# Patient Record
Sex: FEMALE | Race: WHITE | NOT HISPANIC OR LATINO
[De-identification: names, ages, dates, MRNs, and addresses within clinical notes are randomized per-mention and may not be internally consistent; named-entity substitution may affect disease eponyms.]

---

## 2018-03-28 ENCOUNTER — RESULT REVIEW (OUTPATIENT)
Age: 38
End: 2018-03-28

## 2018-06-22 ENCOUNTER — ASOB RESULT (OUTPATIENT)
Age: 38
End: 2018-06-22

## 2018-06-22 ENCOUNTER — APPOINTMENT (OUTPATIENT)
Dept: ANTEPARTUM | Facility: CLINIC | Age: 38
End: 2018-06-22
Payer: COMMERCIAL

## 2018-06-22 PROCEDURE — 76811 OB US DETAILED SNGL FETUS: CPT

## 2018-06-22 PROCEDURE — 76817 TRANSVAGINAL US OBSTETRIC: CPT | Mod: 59

## 2018-10-28 ENCOUNTER — INPATIENT (INPATIENT)
Facility: HOSPITAL | Age: 38
LOS: 1 days | Discharge: HOME | End: 2018-10-30
Attending: OBSTETRICS & GYNECOLOGY | Admitting: OBSTETRICS & GYNECOLOGY
Payer: COMMERCIAL

## 2018-10-28 VITALS
OXYGEN SATURATION: 98 % | HEART RATE: 69 BPM | SYSTOLIC BLOOD PRESSURE: 127 MMHG | TEMPERATURE: 98 F | DIASTOLIC BLOOD PRESSURE: 72 MMHG | RESPIRATION RATE: 16 BRPM

## 2018-10-28 DIAGNOSIS — Z34.80 ENCOUNTER FOR SUPERVISION OF OTHER NORMAL PREGNANCY, UNSPECIFIED TRIMESTER: ICD-10-CM

## 2018-10-28 DIAGNOSIS — Z3A.00 WEEKS OF GESTATION OF PREGNANCY NOT SPECIFIED: ICD-10-CM

## 2018-10-28 DIAGNOSIS — O26.899 OTHER SPECIFIED PREGNANCY RELATED CONDITIONS, UNSPECIFIED TRIMESTER: ICD-10-CM

## 2018-10-28 LAB
BASOPHILS # BLD AUTO: 0 K/UL — SIGNIFICANT CHANGE UP (ref 0–0.2)
BASOPHILS NFR BLD AUTO: 0 % — SIGNIFICANT CHANGE UP (ref 0–2)
EOSINOPHIL # BLD AUTO: 0 K/UL — SIGNIFICANT CHANGE UP (ref 0–0.5)
EOSINOPHIL NFR BLD AUTO: 0.2 % — SIGNIFICANT CHANGE UP (ref 0–6)
HCT VFR BLD CALC: 38.7 % — SIGNIFICANT CHANGE UP (ref 34.5–45)
HGB BLD-MCNC: 13.4 G/DL — SIGNIFICANT CHANGE UP (ref 11.5–15.5)
LYMPHOCYTES # BLD AUTO: 1.1 K/UL — SIGNIFICANT CHANGE UP (ref 1–3.3)
LYMPHOCYTES # BLD AUTO: 8.7 % — LOW (ref 13–44)
MCHC RBC-ENTMCNC: 28.8 PG — SIGNIFICANT CHANGE UP (ref 27–34)
MCHC RBC-ENTMCNC: 34.6 GM/DL — SIGNIFICANT CHANGE UP (ref 32–36)
MCV RBC AUTO: 83.3 FL — SIGNIFICANT CHANGE UP (ref 80–100)
MONOCYTES # BLD AUTO: 0.5 K/UL — SIGNIFICANT CHANGE UP (ref 0–0.9)
MONOCYTES NFR BLD AUTO: 3.9 % — SIGNIFICANT CHANGE UP (ref 2–14)
NEUTROPHILS # BLD AUTO: 10.7 K/UL — HIGH (ref 1.8–7.4)
NEUTROPHILS NFR BLD AUTO: 87.2 % — HIGH (ref 43–77)
PLATELET # BLD AUTO: 219 K/UL — SIGNIFICANT CHANGE UP (ref 150–400)
RBC # BLD: 4.65 M/UL — SIGNIFICANT CHANGE UP (ref 3.8–5.2)
RBC # FLD: 13.2 % — SIGNIFICANT CHANGE UP (ref 10.3–14.5)
WBC # BLD: 12.3 K/UL — HIGH (ref 3.8–10.5)
WBC # FLD AUTO: 12.3 K/UL — HIGH (ref 3.8–10.5)

## 2018-10-28 PROCEDURE — 86077 PHYS BLOOD BANK SERV XMATCH: CPT

## 2018-10-28 RX ORDER — OXYTOCIN 10 UNIT/ML
41.67 VIAL (ML) INJECTION
Qty: 20 | Refills: 0 | Status: DISCONTINUED | OUTPATIENT
Start: 2018-10-28 | End: 2018-10-28

## 2018-10-28 RX ORDER — SODIUM CHLORIDE 9 MG/ML
1000 INJECTION, SOLUTION INTRAVENOUS
Qty: 0 | Refills: 0 | Status: DISCONTINUED | OUTPATIENT
Start: 2018-10-28 | End: 2018-10-28

## 2018-10-28 RX ORDER — IBUPROFEN 200 MG
600 TABLET ORAL EVERY 6 HOURS
Qty: 0 | Refills: 0 | Status: COMPLETED | OUTPATIENT
Start: 2018-10-28 | End: 2019-09-26

## 2018-10-28 RX ORDER — CITRIC ACID/SODIUM CITRATE 300-500 MG
15 SOLUTION, ORAL ORAL EVERY 4 HOURS
Qty: 0 | Refills: 0 | Status: DISCONTINUED | OUTPATIENT
Start: 2018-10-28 | End: 2018-10-28

## 2018-10-28 RX ORDER — SODIUM CHLORIDE 9 MG/ML
3 INJECTION INTRAMUSCULAR; INTRAVENOUS; SUBCUTANEOUS EVERY 8 HOURS
Qty: 0 | Refills: 0 | Status: DISCONTINUED | OUTPATIENT
Start: 2018-10-28 | End: 2018-10-28

## 2018-10-28 RX ORDER — OXYCODONE HYDROCHLORIDE 5 MG/1
5 TABLET ORAL
Qty: 0 | Refills: 0 | Status: DISCONTINUED | OUTPATIENT
Start: 2018-10-28 | End: 2018-10-30

## 2018-10-28 RX ORDER — IBUPROFEN 200 MG
600 TABLET ORAL EVERY 6 HOURS
Qty: 0 | Refills: 0 | Status: DISCONTINUED | OUTPATIENT
Start: 2018-10-28 | End: 2018-10-30

## 2018-10-28 RX ORDER — PRAMOXINE HYDROCHLORIDE 150 MG/15G
1 AEROSOL, FOAM RECTAL EVERY 4 HOURS
Qty: 0 | Refills: 0 | Status: DISCONTINUED | OUTPATIENT
Start: 2018-10-28 | End: 2018-10-30

## 2018-10-28 RX ORDER — DOCUSATE SODIUM 100 MG
100 CAPSULE ORAL
Qty: 0 | Refills: 0 | Status: DISCONTINUED | OUTPATIENT
Start: 2018-10-28 | End: 2018-10-30

## 2018-10-28 RX ORDER — SIMETHICONE 80 MG/1
80 TABLET, CHEWABLE ORAL EVERY 6 HOURS
Qty: 0 | Refills: 0 | Status: DISCONTINUED | OUTPATIENT
Start: 2018-10-28 | End: 2018-10-30

## 2018-10-28 RX ORDER — MAGNESIUM HYDROXIDE 400 MG/1
30 TABLET, CHEWABLE ORAL
Qty: 0 | Refills: 0 | Status: DISCONTINUED | OUTPATIENT
Start: 2018-10-28 | End: 2018-10-30

## 2018-10-28 RX ORDER — OXYTOCIN 10 UNIT/ML
333.33 VIAL (ML) INJECTION
Qty: 20 | Refills: 0 | Status: DISCONTINUED | OUTPATIENT
Start: 2018-10-28 | End: 2018-10-28

## 2018-10-28 RX ORDER — DIBUCAINE 1 %
1 OINTMENT (GRAM) RECTAL EVERY 4 HOURS
Qty: 0 | Refills: 0 | Status: DISCONTINUED | OUTPATIENT
Start: 2018-10-28 | End: 2018-10-28

## 2018-10-28 RX ORDER — DIPHENHYDRAMINE HCL 50 MG
25 CAPSULE ORAL EVERY 6 HOURS
Qty: 0 | Refills: 0 | Status: DISCONTINUED | OUTPATIENT
Start: 2018-10-28 | End: 2018-10-30

## 2018-10-28 RX ORDER — OXYTOCIN 10 UNIT/ML
41.67 VIAL (ML) INJECTION
Qty: 20 | Refills: 0 | Status: DISCONTINUED | OUTPATIENT
Start: 2018-10-28 | End: 2018-10-30

## 2018-10-28 RX ORDER — TETANUS TOXOID, REDUCED DIPHTHERIA TOXOID AND ACELLULAR PERTUSSIS VACCINE, ADSORBED 5; 2.5; 8; 8; 2.5 [IU]/.5ML; [IU]/.5ML; UG/.5ML; UG/.5ML; UG/.5ML
0.5 SUSPENSION INTRAMUSCULAR ONCE
Qty: 0 | Refills: 0 | Status: DISCONTINUED | OUTPATIENT
Start: 2018-10-28 | End: 2018-10-30

## 2018-10-28 RX ORDER — ACETAMINOPHEN 500 MG
975 TABLET ORAL EVERY 6 HOURS
Qty: 0 | Refills: 0 | Status: DISCONTINUED | OUTPATIENT
Start: 2018-10-28 | End: 2018-10-30

## 2018-10-28 RX ORDER — DIBUCAINE 1 %
1 OINTMENT (GRAM) RECTAL EVERY 4 HOURS
Qty: 0 | Refills: 0 | Status: DISCONTINUED | OUTPATIENT
Start: 2018-10-28 | End: 2018-10-30

## 2018-10-28 RX ORDER — AER TRAVELER 0.5 G/1
1 SOLUTION RECTAL; TOPICAL EVERY 4 HOURS
Qty: 0 | Refills: 0 | Status: DISCONTINUED | OUTPATIENT
Start: 2018-10-28 | End: 2018-10-28

## 2018-10-28 RX ORDER — HYDROCORTISONE 1 %
1 OINTMENT (GRAM) TOPICAL EVERY 4 HOURS
Qty: 0 | Refills: 0 | Status: DISCONTINUED | OUTPATIENT
Start: 2018-10-28 | End: 2018-10-28

## 2018-10-28 RX ORDER — ACETAMINOPHEN 500 MG
975 TABLET ORAL EVERY 6 HOURS
Qty: 0 | Refills: 0 | Status: COMPLETED | OUTPATIENT
Start: 2018-10-28 | End: 2019-09-26

## 2018-10-28 RX ORDER — AER TRAVELER 0.5 G/1
1 SOLUTION RECTAL; TOPICAL EVERY 4 HOURS
Qty: 0 | Refills: 0 | Status: DISCONTINUED | OUTPATIENT
Start: 2018-10-28 | End: 2018-10-30

## 2018-10-28 RX ORDER — OXYCODONE HYDROCHLORIDE 5 MG/1
5 TABLET ORAL EVERY 4 HOURS
Qty: 0 | Refills: 0 | Status: DISCONTINUED | OUTPATIENT
Start: 2018-10-28 | End: 2018-10-30

## 2018-10-28 RX ORDER — HYDROCORTISONE 1 %
1 OINTMENT (GRAM) TOPICAL EVERY 4 HOURS
Qty: 0 | Refills: 0 | Status: DISCONTINUED | OUTPATIENT
Start: 2018-10-28 | End: 2018-10-30

## 2018-10-28 RX ORDER — SODIUM CHLORIDE 9 MG/ML
500 INJECTION, SOLUTION INTRAVENOUS ONCE
Qty: 0 | Refills: 0 | Status: DISCONTINUED | OUTPATIENT
Start: 2018-10-28 | End: 2018-10-28

## 2018-10-28 RX ORDER — PRAMOXINE HYDROCHLORIDE 150 MG/15G
1 AEROSOL, FOAM RECTAL EVERY 4 HOURS
Qty: 0 | Refills: 0 | Status: DISCONTINUED | OUTPATIENT
Start: 2018-10-28 | End: 2018-10-28

## 2018-10-28 RX ORDER — GLYCERIN ADULT
1 SUPPOSITORY, RECTAL RECTAL AT BEDTIME
Qty: 0 | Refills: 0 | Status: DISCONTINUED | OUTPATIENT
Start: 2018-10-28 | End: 2018-10-30

## 2018-10-28 RX ORDER — KETOROLAC TROMETHAMINE 30 MG/ML
30 SYRINGE (ML) INJECTION ONCE
Qty: 0 | Refills: 0 | Status: DISCONTINUED | OUTPATIENT
Start: 2018-10-28 | End: 2018-10-28

## 2018-10-28 RX ORDER — LANOLIN
1 OINTMENT (GRAM) TOPICAL EVERY 6 HOURS
Qty: 0 | Refills: 0 | Status: DISCONTINUED | OUTPATIENT
Start: 2018-10-28 | End: 2018-10-30

## 2018-10-28 RX ORDER — SODIUM CHLORIDE 9 MG/ML
3 INJECTION INTRAMUSCULAR; INTRAVENOUS; SUBCUTANEOUS EVERY 8 HOURS
Qty: 0 | Refills: 0 | Status: DISCONTINUED | OUTPATIENT
Start: 2018-10-28 | End: 2018-10-30

## 2018-10-28 RX ADMIN — Medication 30 MILLIGRAM(S): at 13:23

## 2018-10-28 RX ADMIN — Medication 125 MILLIUNIT(S)/MIN: at 12:36

## 2018-10-28 RX ADMIN — Medication 30 MILLIGRAM(S): at 12:34

## 2018-10-28 RX ADMIN — Medication 975 MILLIGRAM(S): at 18:18

## 2018-10-29 LAB
HCT VFR BLD CALC: 33.7 % — LOW (ref 34.5–45)
HGB BLD-MCNC: 11.8 G/DL — SIGNIFICANT CHANGE UP (ref 11.5–15.5)
KLEIHAUER-BETKE CALCULATION: 0 % — SIGNIFICANT CHANGE UP (ref 0–0.3)
T PALLIDUM AB TITR SER: NEGATIVE — SIGNIFICANT CHANGE UP

## 2018-10-29 RX ADMIN — Medication 975 MILLIGRAM(S): at 17:22

## 2018-10-29 RX ADMIN — Medication 600 MILLIGRAM(S): at 11:08

## 2018-10-29 RX ADMIN — Medication 600 MILLIGRAM(S): at 18:00

## 2018-10-29 RX ADMIN — Medication 975 MILLIGRAM(S): at 18:00

## 2018-10-29 RX ADMIN — Medication 600 MILLIGRAM(S): at 11:50

## 2018-10-29 RX ADMIN — Medication 100 MILLIGRAM(S): at 06:00

## 2018-10-29 RX ADMIN — Medication 1 TABLET(S): at 09:29

## 2018-10-29 RX ADMIN — Medication 975 MILLIGRAM(S): at 11:08

## 2018-10-29 RX ADMIN — Medication 975 MILLIGRAM(S): at 11:50

## 2018-10-29 RX ADMIN — Medication 600 MILLIGRAM(S): at 17:22

## 2018-10-29 NOTE — PROGRESS NOTE ADULT - SUBJECTIVE AND OBJECTIVE BOX
PPD#1- ATTENDING NOTE    S: Patient doing well. Minimal lochia. Pain controlled.    O: Vital Signs Last 24 Hrs  T(C): 36.7 (29 Oct 2018 05:46), Max: 37.2 (28 Oct 2018 14:54)  T(F): 98 (29 Oct 2018 05:46), Max: 98.9 (28 Oct 2018 14:54)  HR: 76 (29 Oct 2018 05:46) (64 - 80)  BP: 116/85 (29 Oct 2018 05:46) (111/62 - 148/60)  BP(mean): --  RR: 18 (29 Oct 2018 05:46) (16 - 18)  SpO2: 99% (29 Oct 2018 05:46) (97% - 99%)    Gen: NAD  Abd: soft, NT, ND, fundus firm below umbilicus  Lochia: moderate  Ext: no tenderness, no hyper reflexia  Voiding well    Labs:                        11.8   x     )-----------( x        ( 29 Oct 2018 06:21 )             33.7     ABO Interpretation: B (10-28 @ 13:40)  Rh Interpretation: Negative (10-28 @ 13:40)    Antibody Screen Positive  baby is   B pos.  rhogam today    A: 38y PPD# s/p  doing well.     Plan:  Analgesia prn  Regular diet        Office [phone]  Dr. Mead

## 2018-10-30 ENCOUNTER — TRANSCRIPTION ENCOUNTER (OUTPATIENT)
Age: 38
End: 2018-10-30

## 2018-10-30 VITALS
HEART RATE: 71 BPM | OXYGEN SATURATION: 98 % | SYSTOLIC BLOOD PRESSURE: 115 MMHG | TEMPERATURE: 98 F | DIASTOLIC BLOOD PRESSURE: 79 MMHG | RESPIRATION RATE: 18 BRPM

## 2018-10-30 PROCEDURE — 86900 BLOOD TYPING SEROLOGIC ABO: CPT

## 2018-10-30 PROCEDURE — 86901 BLOOD TYPING SEROLOGIC RH(D): CPT

## 2018-10-30 PROCEDURE — 86780 TREPONEMA PALLIDUM: CPT

## 2018-10-30 PROCEDURE — 59025 FETAL NON-STRESS TEST: CPT

## 2018-10-30 PROCEDURE — G0463: CPT

## 2018-10-30 PROCEDURE — 59050 FETAL MONITOR W/REPORT: CPT

## 2018-10-30 PROCEDURE — 85027 COMPLETE CBC AUTOMATED: CPT

## 2018-10-30 PROCEDURE — 86870 RBC ANTIBODY IDENTIFICATION: CPT

## 2018-10-30 PROCEDURE — 85014 HEMATOCRIT: CPT

## 2018-10-30 PROCEDURE — 85460 HEMOGLOBIN FETAL: CPT

## 2018-10-30 PROCEDURE — 86850 RBC ANTIBODY SCREEN: CPT

## 2018-10-30 PROCEDURE — 90707 MMR VACCINE SC: CPT

## 2018-10-30 PROCEDURE — 85018 HEMOGLOBIN: CPT

## 2018-10-30 RX ORDER — IBUPROFEN 200 MG
1 TABLET ORAL
Qty: 0 | Refills: 0 | COMMUNITY
Start: 2018-10-30

## 2018-10-30 RX ADMIN — Medication 1 TABLET(S): at 10:45

## 2018-10-30 RX ADMIN — Medication 600 MILLIGRAM(S): at 04:45

## 2018-10-30 RX ADMIN — Medication 0.5 MILLILITER(S): at 10:47

## 2018-10-30 RX ADMIN — Medication 600 MILLIGRAM(S): at 11:30

## 2018-10-30 RX ADMIN — Medication 975 MILLIGRAM(S): at 11:30

## 2018-10-30 RX ADMIN — Medication 975 MILLIGRAM(S): at 10:45

## 2018-10-30 RX ADMIN — Medication 600 MILLIGRAM(S): at 10:45

## 2018-10-30 RX ADMIN — Medication 600 MILLIGRAM(S): at 04:00

## 2018-10-30 RX ADMIN — Medication 975 MILLIGRAM(S): at 04:00

## 2018-10-30 RX ADMIN — Medication 975 MILLIGRAM(S): at 04:45

## 2018-10-30 NOTE — DISCHARGE NOTE OB - CARE PROVIDER_API CALL
Julita Lo), [facility] at [address]  [place] [address]  Phone: [phone]  Fax: [phone]

## 2018-10-30 NOTE — DISCHARGE NOTE OB - HOSPITAL COURSE
Pt was admitted in labor, had vaginal delivery. Postpartum course was uncomplicated and pt discharged on PPD2

## 2018-10-30 NOTE — DISCHARGE NOTE OB - PATIENT PORTAL LINK FT
You can access the [facility] Portal, offered by [facility], by registering with the following website: [email or web address]

## 2018-10-30 NOTE — DISCHARGE NOTE OB - MEDICATION SUMMARY - MEDICATIONS TO TAKE
I will START or STAY ON the medications listed below when I get home from the hospital:    acetaminophen 325 mg oral tablet  -- 3 tab(s) by mouth every 6 hours  -- Indication: For pain     mg oral tablet  -- 1 tab(s) by mouth every 6 hours  -- Indication: For pain    Prenatal Multivitamins with Folic Acid 1 mg oral tablet  -- 1 tab(s) by mouth once a day  -- Indication: For routine postpartum care

## 2018-10-30 NOTE — PROGRESS NOTE ADULT - ASSESSMENT
A: 38y PPD#2 s/p  doing well.    Plan: cont PP care  OOB/ambulation  Pain control  discharge today- instructions for follow up given

## 2018-10-30 NOTE — DISCHARGE NOTE OB - MATERIALS PROVIDED
Back To Sleep Handout/[facility] Hearing Screen Program/New Beginnings/Shaken Baby Prevention Handout/Birth Certificate Instructions/[facility] [place] Screening Program   Immunization Record/Birth Certificate Instructions/MMR Vaccination (VIS Pub Date: 2012)/[facility] Hearing Screen Program/Shaken Baby Prevention Handout/Breastfeeding Guide and Packet/New Beginnings/Vaccinations/[facility] Screening Program/Guide to Postpartum Care/Back To Sleep Handout/Discharge Medication Information for Patients and Families Pocket Guide/Breastfeeding Log/Breastfeeding Mother’s Support Group Information

## 2018-10-30 NOTE — PROGRESS NOTE ADULT - SUBJECTIVE AND OBJECTIVE BOX
OB Attending Note    S: Patient doing well. Minimal lochia. Pain controlled.    O: Vital Signs Last 24 Hrs  T(C): 36.5 (30 Oct 2018 06:07), Max: 36.6 (29 Oct 2018 17:41)  T(F): 97.7 (30 Oct 2018 06:07), Max: 97.9 (29 Oct 2018 17:41)  HR: 86 (30 Oct 2018 06:07) (82 - 86)  BP: 98/63 (30 Oct 2018 06:07) (98/63 - 105/73)  BP(mean): --  RR: 18 (30 Oct 2018 06:07) (18 - 18)  SpO2: 98% (29 Oct 2018 17:41) (98% - 98%)    Gen: NAD  Abd: soft, NT, ND, fundus firm below umbilicus  Lochia: min  Perineum healing well  Ext: no tenderness    Labs:                        11.8   x     )-----------( x        ( 29 Oct 2018 06:21 )             33.7

## 2018-10-30 NOTE — DISCHARGE NOTE OB - CARE PLAN
Principal Discharge DX:	Vaginal delivery  Goal:	postpartum care  Assessment and plan of treatment:	routine postpartum care

## 2018-12-11 ENCOUNTER — RESULT REVIEW (OUTPATIENT)
Age: 38
End: 2018-12-11

## 2020-07-28 ENCOUNTER — RESULT REVIEW (OUTPATIENT)
Age: 40
End: 2020-07-28

## 2023-05-02 ENCOUNTER — APPOINTMENT (OUTPATIENT)
Dept: ORTHOPEDIC SURGERY | Facility: CLINIC | Age: 43
End: 2023-05-02
Payer: COMMERCIAL

## 2023-05-02 ENCOUNTER — TRANSCRIPTION ENCOUNTER (OUTPATIENT)
Age: 43
End: 2023-05-02

## 2023-05-02 DIAGNOSIS — M51.9 UNSPECIFIED THORACIC, THORACOLUMBAR AND LUMBOSACRAL INTERVERTEBRAL DISC DISORDER: ICD-10-CM

## 2023-05-02 DIAGNOSIS — M54.16 RADICULOPATHY, LUMBAR REGION: ICD-10-CM

## 2023-05-02 PROCEDURE — 72110 X-RAY EXAM L-2 SPINE 4/>VWS: CPT

## 2023-05-02 PROCEDURE — 72170 X-RAY EXAM OF PELVIS: CPT

## 2023-05-02 PROCEDURE — 99204 OFFICE O/P NEW MOD 45 MIN: CPT

## 2023-05-02 RX ORDER — NAPROXEN 500 MG/1
500 TABLET ORAL
Qty: 60 | Refills: 0 | Status: ACTIVE | COMMUNITY
Start: 2023-05-02 | End: 1900-01-01

## 2023-05-02 RX ORDER — GABAPENTIN 100 MG/1
100 CAPSULE ORAL
Qty: 60 | Refills: 0 | Status: ACTIVE | COMMUNITY
Start: 2023-05-02 | End: 1900-01-01

## 2023-05-02 NOTE — HISTORY OF PRESENT ILLNESS
[Lower back] : lower back [4] : 4 [Dull/Aching] : dull/aching [Sharp] : sharp [Shooting] : shooting [Intermittent] : intermittent [de-identified] : 5/2/23- 44 y/o F presents for bilateral  lower back pain X several years. Denies injury. Does report prior history of HNPs.  Associated intermittent radiation down RLE posteriorly from glutes to ankle, with N/T. Aggravated by bending forward. Has been going to chiro on and off for the last several years. No recent physical therapy. Denies b/b dysfunction. No prior back surgeries. \par \par PMH: denies [] : no [FreeTextEntry5] : History of back pain for 20 years, She states last year she had a spasm in her lower back and could not get up from her bed. Occasional Numbness/Tingling down the right leg. [FreeTextEntry7] : d [de-identified] : MRI

## 2023-05-02 NOTE — IMAGING
[Disc space narrowing] : Disc space narrowing [Scoliosis] : Scoliosis [AP] : anteroposterior [There are no fractures, subluxations or dislocations. No significant abnormalities are seen] : There are no fractures, subluxations or dislocations. No significant abnormalities are seen [de-identified] : LSPINE\par Inspection: No rash or ecchymosis\par Palpation: bilateral lumbar paraspinal tenderness\par ROM: Full with stiffness\par Strength: 5/5 bilateral hip flexors, knee extensors, ankle dorsiflexors, EHL, ankle plantarflexors\par Sensation: Sensation present to light touch bilateral L2-S1 distributions\par Provocative maneuvers: Negative bilateral straight leg raise. Tight hamstrings bilaterally\par \par Bilateral hips-\par Palpation: No tenderness to palpation over greater trochanter or IT band\par \par  [FreeTextEntry1] : L4-5, L5-S1 DDD

## 2023-05-02 NOTE — ASSESSMENT
[FreeTextEntry1] : L4-5/L5-S1 DDD with history of HNPs as per patient. RLE radic.\par PT, meds\par f/u 6 weeks\par consider imaging if no improvement\par \par NSAIDs- Patient warned of risk of medication to GI tract, increased blood pressure, cardiac risk, and risk of fluid retention.  Advised to clear medication with internist or PCP if any concurrent health problem with heart, blood pressure, or GI system exists.\par \par Gabapentin- Patient advised of sedating effects, instructed not to drive, operate machinery, or take with other sedating medications. Advised of need to taper on/off medication and risk of abruptly stopping gabapentin.\par \par Patient seen by Nuvia Lerner PA-C, with and under the supervision of  Dr. Chano Guevara M.D.\par

## 2023-06-13 ENCOUNTER — APPOINTMENT (OUTPATIENT)
Dept: ORTHOPEDIC SURGERY | Facility: CLINIC | Age: 43
End: 2023-06-13

## 2025-04-14 NOTE — DISCHARGE NOTE OB - DRINK 8 TO 10 GLASSES OF FLUID EACH DAY
No care due was identified.  [facility] Embedded Care Due Messages. Reference number: [id number].   4/14/2025 1:19:03 PM CDT  
Statement Selected